# Patient Record
Sex: MALE | URBAN - METROPOLITAN AREA
[De-identification: names, ages, dates, MRNs, and addresses within clinical notes are randomized per-mention and may not be internally consistent; named-entity substitution may affect disease eponyms.]

---

## 2022-04-28 ENCOUNTER — WEB ENCOUNTER (OUTPATIENT)
Dept: URBAN - METROPOLITAN AREA CLINIC 78 | Facility: CLINIC | Age: 62
End: 2022-04-28

## 2022-04-29 ENCOUNTER — OFFICE VISIT (OUTPATIENT)
Dept: URBAN - METROPOLITAN AREA CLINIC 78 | Facility: CLINIC | Age: 62
End: 2022-04-29
Payer: COMMERCIAL

## 2022-04-29 VITALS
BODY MASS INDEX: 24.8 KG/M2 | WEIGHT: 158 LBS | TEMPERATURE: 97.6 F | DIASTOLIC BLOOD PRESSURE: 82 MMHG | RESPIRATION RATE: 16 BRPM | SYSTOLIC BLOOD PRESSURE: 126 MMHG | HEIGHT: 67 IN

## 2022-04-29 DIAGNOSIS — R63.4 WEIGHT LOSS: ICD-10-CM

## 2022-04-29 DIAGNOSIS — R22.2 ABDOMINAL WALL LUMP: ICD-10-CM

## 2022-04-29 DIAGNOSIS — Z53.20 SCREENING DECLINED BY PATIENT: ICD-10-CM

## 2022-04-29 DIAGNOSIS — E13.9 DIABETES 1.5, MANAGED AS TYPE 2: ICD-10-CM

## 2022-04-29 DIAGNOSIS — M53.87 SCIATICA ASSOCIATED WITH DISORDER OF LUMBOSACRAL SPINE: ICD-10-CM

## 2022-04-29 PROBLEM — 426875007: Status: ACTIVE | Noted: 2022-04-29

## 2022-04-29 PROCEDURE — 99204 OFFICE O/P NEW MOD 45 MIN: CPT | Performed by: INTERNAL MEDICINE

## 2022-04-29 RX ORDER — GABAPENTIN 100 MG/1
1 CAPSULE CAPSULE ORAL ONCE A DAY
Status: ACTIVE | COMMUNITY

## 2022-04-29 RX ORDER — TRAMADOL HYDROCHLORIDE 50 MG/1
1 TABLET AS NEEDED TABLET, FILM COATED ORAL ONCE A DAY
Status: ACTIVE | COMMUNITY

## 2022-04-29 RX ORDER — DICLOFENAC SODIUM TOPICAL GEL, 1% 10 MG/G
1 APPLICATION GEL TOPICAL TWICE A DAY
Status: ACTIVE | COMMUNITY

## 2022-04-29 NOTE — HPI-TODAY'S VISIT:
Patient was referred by Dr. Frantz Sarkar  A copy of this document will be sent to the physician.  Patient is presenting to clinic with his Nephew  He reports abdominal lump onset is 3 months ago  He states that he has been experiencing same pain in abdomen area , which resolved but the lump showed up . It is getting bigger . Abdominal pain is better though . He does have left side lower back pain  for radiating down his leg ( sciatica ) . Scheduled for surgery    (-) nausea , vomiting , fever chills , change in stools color or consistency  (+ )  weight loss 30 lbs in 4-5 months   Has not checked with PCP about weight loss   Last labs : 3-4 months ago reportedly fine  Personal hx of Diabetes on  5-6 pills

## 2022-04-29 NOTE — PHYSICAL EXAM CONSTITUTIONAL:
well developed, well nourished , in no acute distress , ambulating without assistance by with limp  , normal communication ability

## 2022-04-29 NOTE — PHYSICAL EXAM GASTROINTESTINAL
Abdomen , soft, nontender soft Lipomatous mass in left upper quadrant adjacent to coastal margin and floating rib , nondistended , no guarding or rigidity , no masses palpable , normal bowel sounds , Liver and Spleen , no hepatomegaly present , liver nontender , spleen not palpable

## 2023-01-31 ENCOUNTER — WEB ENCOUNTER (OUTPATIENT)
Dept: URBAN - METROPOLITAN AREA CLINIC 115 | Facility: CLINIC | Age: 63
End: 2023-01-31

## 2023-01-31 ENCOUNTER — OFFICE VISIT (OUTPATIENT)
Dept: URBAN - METROPOLITAN AREA CLINIC 115 | Facility: CLINIC | Age: 63
End: 2023-01-31
Payer: COMMERCIAL

## 2023-01-31 ENCOUNTER — DASHBOARD ENCOUNTERS (OUTPATIENT)
Age: 63
End: 2023-01-31

## 2023-01-31 ENCOUNTER — LAB OUTSIDE AN ENCOUNTER (OUTPATIENT)
Dept: URBAN - METROPOLITAN AREA CLINIC 115 | Facility: CLINIC | Age: 63
End: 2023-01-31

## 2023-01-31 VITALS
DIASTOLIC BLOOD PRESSURE: 77 MMHG | HEIGHT: 65 IN | BODY MASS INDEX: 21.66 KG/M2 | RESPIRATION RATE: 17 BRPM | HEART RATE: 107 BPM | WEIGHT: 130 LBS | SYSTOLIC BLOOD PRESSURE: 92 MMHG | TEMPERATURE: 98.3 F

## 2023-01-31 DIAGNOSIS — Z12.11 ENCOUNTER FOR SCREENING FOR MALIGNANT NEOPLASM OF COLON: ICD-10-CM

## 2023-01-31 DIAGNOSIS — Z12.12 ENCOUNTER FOR SCREENING FOR MALIGNANT NEOPLASM OF RECTUM: ICD-10-CM

## 2023-01-31 PROBLEM — 305058001: Status: ACTIVE | Noted: 2023-01-31

## 2023-01-31 PROCEDURE — 99202 OFFICE O/P NEW SF 15 MIN: CPT | Performed by: INTERNAL MEDICINE

## 2023-01-31 RX ORDER — DULOXETINE 20 MG/1
1 CAPSULE CAPSULE, DELAYED RELEASE PELLETS ORAL TWICE A DAY
Status: ACTIVE | COMMUNITY

## 2023-01-31 RX ORDER — GABAPENTIN 600 MG/1
1 TABLET TABLET, FILM COATED ORAL ONCE A DAY
Status: ACTIVE | COMMUNITY

## 2023-01-31 RX ORDER — GLIPIZIDE 10 MG/1
1 TABLET 30 MINUTES BEFORE BREAKFAST TABLET ORAL ONCE A DAY
Status: ACTIVE | COMMUNITY

## 2023-01-31 RX ORDER — METFORMIN HYDROCHLORIDE 1000 MG/1
1 TABLET WITH A MEAL TABLET, FILM COATED ORAL ONCE A DAY
Status: ACTIVE | COMMUNITY

## 2023-01-31 RX ORDER — EMPAGLIFLOZIN, LINAGLIPTIN, METFORMIN HYDROCHLORIDE 25; 5; 1000 MG/1; MG/1; MG/1
1 TABLET WITH THE MORNING MEAL TABLET, EXTENDED RELEASE ORAL ONCE A DAY
Status: ACTIVE | COMMUNITY

## 2023-01-31 NOTE — HPI-TODAY'S VISIT:
53 y/o  man with T2DM that came ti coordinate his first screening colonoscopy,no alarm symptoms or signs.No family hx of CRC.No smoke.denies anemia

## 2023-02-03 PROBLEM — 305058001: Status: ACTIVE | Noted: 2023-01-31

## 2023-03-03 ENCOUNTER — TELEPHONE ENCOUNTER (OUTPATIENT)
Dept: URBAN - METROPOLITAN AREA CLINIC 115 | Facility: CLINIC | Age: 63
End: 2023-03-03

## 2023-03-06 ENCOUNTER — OFFICE VISIT (OUTPATIENT)
Dept: URBAN - METROPOLITAN AREA SURGERY CENTER 13 | Facility: SURGERY CENTER | Age: 63
End: 2023-03-06
Payer: COMMERCIAL

## 2023-03-06 ENCOUNTER — CLAIMS CREATED FROM THE CLAIM WINDOW (OUTPATIENT)
Dept: URBAN - METROPOLITAN AREA CLINIC 4 | Facility: CLINIC | Age: 63
End: 2023-03-06
Payer: COMMERCIAL

## 2023-03-06 DIAGNOSIS — D12.3 ADENOMA OF TRANSVERSE COLON: ICD-10-CM

## 2023-03-06 DIAGNOSIS — D12.3 BENIGN NEOPLASM OF TRANSVERSE COLON: ICD-10-CM

## 2023-03-06 DIAGNOSIS — K62.1 ANAL AND RECTAL POLYP: ICD-10-CM

## 2023-03-06 DIAGNOSIS — K63.5 BENIGN COLON POLYP: ICD-10-CM

## 2023-03-06 DIAGNOSIS — Z12.11 COLON CANCER SCREENING: ICD-10-CM

## 2023-03-06 PROCEDURE — G8907 PT DOC NO EVENTS ON DISCHARG: HCPCS | Performed by: INTERNAL MEDICINE

## 2023-03-06 PROCEDURE — 88305 TISSUE EXAM BY PATHOLOGIST: CPT | Performed by: PATHOLOGY

## 2023-03-06 PROCEDURE — 45380 COLONOSCOPY AND BIOPSY: CPT | Performed by: INTERNAL MEDICINE

## 2023-03-06 PROCEDURE — 45385 COLONOSCOPY W/LESION REMOVAL: CPT | Performed by: INTERNAL MEDICINE
